# Patient Record
Sex: MALE | Race: WHITE | NOT HISPANIC OR LATINO | URBAN - METROPOLITAN AREA
[De-identification: names, ages, dates, MRNs, and addresses within clinical notes are randomized per-mention and may not be internally consistent; named-entity substitution may affect disease eponyms.]

---
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
## 2023-08-23 NOTE — PROCEDURE: TREATMENT REGIMEN
[Time Spent: ___ minutes] : I have spent [unfilled] minutes of time on the encounter.
Initiate Treatment: Epiduo forte to skin lesion on nose QHS\\nCLODERM QD x five days
Samples Given: Epiduo forte\\nCLODERM
Detail Level: Zone

## 2023-08-29 ENCOUNTER — APPOINTMENT (RX ONLY)
Dept: URBAN - METROPOLITAN AREA CLINIC 33 | Facility: CLINIC | Age: 18
Setting detail: DERMATOLOGY
End: 2023-08-29

## 2023-08-29 DIAGNOSIS — B07.8 OTHER VIRAL WARTS: ICD-10-CM

## 2023-08-29 PROCEDURE — ? LIQUID NITROGEN

## 2023-08-29 PROCEDURE — 17110 DESTRUCTION B9 LES UP TO 14: CPT

## 2023-08-29 PROCEDURE — ? PRESCRIPTION

## 2023-08-29 PROCEDURE — ? TREATMENT REGIMEN

## 2023-08-29 PROCEDURE — ? COUNSELING

## 2023-08-29 RX ORDER — FLUOROURACIL 5 MG/G
CREAM TOPICAL QHS
Qty: 40 | Refills: 0 | Status: ERX | COMMUNITY
Start: 2023-08-29

## 2023-08-29 RX ADMIN — FLUOROURACIL: 5 CREAM TOPICAL at 00:00

## 2023-08-29 ASSESSMENT — LOCATION ZONE DERM: LOCATION ZONE: FINGER

## 2023-08-29 ASSESSMENT — LOCATION SIMPLE DESCRIPTION DERM
LOCATION SIMPLE: RIGHT THUMB
LOCATION SIMPLE: LEFT SMALL FINGER

## 2023-08-29 ASSESSMENT — LOCATION DETAILED DESCRIPTION DERM
LOCATION DETAILED: LEFT PROXIMAL PALMAR SMALL FINGER
LOCATION DETAILED: RIGHT DISTAL ULNAR THUMB

## 2023-08-29 NOTE — PROCEDURE: TREATMENT REGIMEN
Detail Level: Zone
Initiate Treatment: OTC Salicylic Acid AM\\nFluorouracil 5 % cream PM with occlusion
Plan: Patient leaving for [place] tomorrow to attend college. Requests topical regimen if lesions do not resolve after LN2 treatment today.

## 2023-08-29 NOTE — PROCEDURE: LIQUID NITROGEN
Show Aperture Variable?: Yes
Include Z78.9 (Other Specified Conditions Influencing Health Status) As An Associated Diagnosis?: No
Post-Care Instructions: I reviewed with the patient in detail post-care instructions. Patient is to wear sunprotection, and avoid picking at any of the treated lesions. Pt may apply Vaseline to crusted or scabbing areas.
Detail Level: Detailed
Consent: The patient's consent was obtained including but not limited to risks of crusting, scabbing, blistering, scarring, darker or lighter pigmentary change, recurrence, incomplete removal and infection.
Medical Necessity Information: It is in your best interest to select a reason for this procedure from the list below. All of these items fulfill various CMS LCD requirements except the new and changing color options.
Medical Necessity Clause: This procedure was medically necessary because the lesions that were treated were:
Spray Paint Text: The liquid nitrogen was applied to the skin utilizing a spray paint frosting technique.

## 2024-12-23 NOTE — PROCEDURE: LIQUID NITROGEN
79F with PMH of afib, HTN, DM, h/o stroke presents to the ED for 1 week history of worsening left leg pain and discoloration. In the ED, provider unable to obtain pulses or doppler signals bilaterally. S/p Left above knee amputation.    Consulted for diabetes management  Home regimen: glipizide, farxiga  Current a1c: 11.5%    1. Uncontrolled DM2- glucoses stable  - Continue lantus 15 units daily  - Continue admelog 5 units TID  - Correction scale  - Will need insulin on discharge, please provide diabetes education and insulin teaching    2. Left AKA  - Pain control, care per vascular surgery    3. Atrial fibrillation  - On metoprolol, eliquis  79F with PMH of afib, HTN, DM, h/o stroke presents to the ED for 1 week history of worsening left leg pain and discoloration. In the ED, provider unable to obtain pulses or doppler signals bilaterally. S/p Left above knee amputation.    Consulted for diabetes management  Home regimen: glipizide, farxiga  Current a1c: 11.5%    1. Uncontrolled DM2- glucoses stable  - Continue lantus 15 units daily  - Continue admelog 5 units TID  - Correction scale  - Will need insulin on discharge, please provide diabetes education and insulin teaching  - Follow up appt: 1/31 @ 10:30am BRADY Boucher ([address] [facility])    2. Left AKA  - Pain control, care per vascular surgery    3. Atrial fibrillation  - On metoprolol, eliquis  79F with PMH of afib, HTN, DM, h/o stroke presents to the ED for 1 week history of worsening left leg pain and discoloration. In the ED, provider unable to obtain pulses or doppler signals bilaterally. S/p Left above knee amputation.    Consulted for diabetes management  Home regimen: glipizide, farxiga  Current a1c: 11.5%    1. Uncontrolled DM2 complicated by PAD- glucoses stable  - Continue lantus 15 units daily  - Continue admelog 5 units TID  - Correction scale  - Will need insulin on discharge, please provide diabetes education and insulin teaching  - Follow up appt: 1/31 @ 10:30am BRADY Boucher ([address] [facility])    2. PAD -s/p Left AKA, bilateral femoral artery thrombectomies  - Pain control, care per vascular surgery    3. Atrial fibrillation  - On metoprolol, eliquis  Consent: The patient's consent was obtained including but not limited to risks of crusting, scabbing, blistering, scarring, darker or lighter pigmentary change, recurrence, incomplete removal and infection.